# Patient Record
Sex: FEMALE | Race: WHITE | Employment: OTHER | URBAN - METROPOLITAN AREA
[De-identification: names, ages, dates, MRNs, and addresses within clinical notes are randomized per-mention and may not be internally consistent; named-entity substitution may affect disease eponyms.]

---

## 2018-01-02 ENCOUNTER — THERAPY VISIT (OUTPATIENT)
Dept: PHYSICAL THERAPY | Facility: CLINIC | Age: 38
End: 2018-01-02
Payer: COMMERCIAL

## 2018-01-02 DIAGNOSIS — M62.89 HIGH-TONE PELVIC FLOOR DYSFUNCTION: Primary | ICD-10-CM

## 2018-01-02 DIAGNOSIS — N39.3 URINARY, INCONTINENCE, STRESS FEMALE: ICD-10-CM

## 2018-01-02 PROCEDURE — 97161 PT EVAL LOW COMPLEX 20 MIN: CPT | Mod: GP | Performed by: PHYSICAL THERAPIST

## 2018-01-02 PROCEDURE — 97112 NEUROMUSCULAR REEDUCATION: CPT | Mod: GP | Performed by: PHYSICAL THERAPIST

## 2018-01-02 PROCEDURE — 97530 THERAPEUTIC ACTIVITIES: CPT | Mod: GP | Performed by: PHYSICAL THERAPIST

## 2018-01-02 NOTE — MR AVS SNAPSHOT
After Visit Summary   1/2/2018    Ashley Rueda    MRN: [id number]           Patient Information     Date Of Birth          1980        Visit Information        Provider Department      1/2/2018 9:25 AM Zuly Sheppard, PT [place] [facility]        Today's Diagnoses     High-tone pelvic floor dysfunction    -  1    Urinary, incontinence, stress female           Follow-ups after your visit        Your next 10 appointments already scheduled     Jan 11, 2018 10:05 AM CST   [facility] For [facility] Only with Zuly Sheppard, PT   [place] [facility] ([facility]  )    [address] [place] [address] [address]  [facility] [id number] [phone]            Jan 18, 2018 10:05 AM CST   MEG For Women Only with Zuly Sheppard PT   [place] [facility] ([facility]  )    [address] [place] [address] [address]  [facility] [id number] [phone]              Who to contact     If you have questions or need follow up information about today's clinic visit or your schedule please contact [place] [facility] directly at [phone].  Normal or non-critical lab and imaging results will be communicated to you by MyChart, letter or phone within 4 business days after the clinic has received the results. If you do not hear from us within 7 days, please contact the clinic through [employer] or phone. If you have a critical or abnormal lab result, we will notify you by phone as soon as possible.  Submit refill requests through [employer] or call your pharmacy and they will forward the refill request to us. Please allow 3 business days for your refill to be completed.          Additional Information About Your Visit        [facility] Health Information     [employer] lets you send messages to your doctor, view your test results, renew your prescriptions, schedule appointments and more. To sign up, go to  [email or web address] . Click on \"Log in\" on the left side of the screen, which will take you to the Welcome page. Then click on \"Sign up Now\" on the right side of the page.     You will be asked to enter the access code listed below, as well as some personal information. Please follow the directions to create your username and password.     Your access code is: [email or web address]  Expires: 2018  1:14 PM     Your access code will  in 90 days. If you need help or a new code, please call your [place] clinic or [phone].        Care EveryWhere ID     This is your Care EveryWhere ID. This could be used by other organizations to access your [place] medical records  [id number]         Blood Pressure from Last 3 Encounters:   12 97/65   11 108/68    Weight from Last 3 Encounters:   12 48.1 kg (106 lb 1.6 oz)   11 48.4 kg (106 lb 12.8 oz)              We Performed the Following     HC PT EVAL, LOW COMPLEXITY     NEUROMUSCULAR RE-EDUCATION     THERAPEUTIC ACTIVITIES        Primary Care Provider Office Phone # Fax #    Mikaela Baird PA-C [phone]       [address] [address]        Equal Access to Services     OTF NOBLES : Hadii kush ku hadasho Soomaali, waaxda luqadaha, qaybta kaalmada adeegyada, kiya farooq. So [facility] [phone].    ATENCIÓN: Si habla español, tiene a gil disposición servicios gratuitos de asistencia lingüística. [place] al [phone].    We comply with applicable federal civil rights laws and [place] laws. We do not discriminate on the basis of race, color, national origin, age, disability, sex, sexual orientation, or gender identity.            Thank you!     [facility] you for choosing [facility]  for your care. Our goal is always to provide you with excellent care. Hearing back from our patients is one way we can continue to improve our " services. Please take a few minutes to complete the written survey that you may receive in the mail after your visit with us. Thank you!             Your Updated Medication List - Protect others around you: Learn how to safely use, store and throw away your medicines at www.disposemymeds.org.          This list is accurate as of: 1/2/18  1:14 PM.  Always use your most recent med list.                   Brand Name Dispense Instructions for use Diagnosis    * norethindrone 0.35 MG per tablet    MICRONOR    3 Package    Take 1 tablet by mouth daily.    Contraception       * norethindrone 0.35 MG per tablet    MICRONOR    3 Package    Take 1 tablet by mouth daily.    Contraception       terconazole 0.4 % cream    TERAZOL 7    45 g    Place 1 applicator vaginally At Bedtime.    Yeast vaginitis       * Notice:  This list has 2 medication(s) that are the same as other medications prescribed for you. Read the directions carefully, and ask your doctor or other care provider to review them with you.

## 2018-01-02 NOTE — PROGRESS NOTES
"[place] [facility] Initial Evaluation  Subjective:  HPI History of current episode:  Patient is a 37 year old female who presents with urinary frequency, urgency and USI.  Patient is a  woman who states both pregnancy's ended in a vaginal delivery.  Patient reports that she tore during both deliveries and noticed an increase in symptoms following the birth of her second child.  Patient also reports that she has been diagnosed with interstitial cystitis back in  and also has had a diagnosis of dyspareunia and vulvodynia.  Patient reports that her vulvodynia and dyspareunia seemed to resolve following the birth of her first child.  Patient is currently a homemaker.  Patient states her goals of PT are to normalize bladder function so frequency and urgency issues do not bother her as well as wake her at night.  Patient states she currently wakes 3 times/night to urinate.    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 No   Do you leak with cough,sneeze, jumping, running?Yes   Any other activities that cause leaking? Yes   Do you have triggers that make you feel you can't wait to go to the bathroom? Yes What are they? After urinating when standing up.  Roadtrips and enviroments where I am unsure of where a bathroom is.  Type of pad and number used per day? 1 pantyliner/day  When you leak what is the amount? small  How long can you delay the need to urinate? \"As long as I need\"  Do you feel excessive pressure in pelvic floor:No   Frequency of daytime " urination:Unsure  Frequency of nighttime urination:3 times/night  Can you stop the flow of urine when on the toilet? Yes  Is the volume of urine passed usually: medium. (8sec rule= 250ml with average bladder storing 400-600ml)  Do you strain to pass urine? No  Do you have a slow or hesitant urinary stream? Yes  Do you have difficulty initiating the urine stream? No  Is urination painful? No  How many bladder infections have you had in last 12 months?0  Fluid intake(one glass is 8oz or one cup)  2 glasses/day,  2 caffinated glasses/day   0 alcohol glasses/day.  Bowel habits:  Frequency of bowel movements? 1  times a day  Consistancy of stool? soft formed  Do you ignore the urge to defecate? No  Do you strain to pass stool? No  Pelvic Pain:  Do you have any pelvic pain with intercourse, exams, use of tampons? No  Is initial penetration during intercourse painful? No  Is deeper penetration painful? No  Do you use lubricant? No What kind?   Are you sexually active?Yes  Have you ever been worried for your physical safety? No  Have you practiced the PF(kegel) exercises for 4 or more weeks? No  Marinoff Scale:Level 1  (Level 3: Abstinence from intercourse because of severe pain. Level 2: Painful intercourse which limites frequency of activity. Level 1: Painful intercourse not severe enough to prevent activity.)    Treatment/Education provided this session (see flow sheet for additional information):    Self Care Management/Patient education (12 min): Today's session consisted of education regarding pelvic floor muscle anatomy, normal bladder function, urge suppression techniques and/or relaxation techniques as indicated, and instruction in how to complete a bladder diary for assessment next visit. Depending on patient presentation, timed voids, double voids, and proper fluid/fiber intake discussed. Pt was instructed in the pathoanatomy of the pelvic floor utilizing pelvic model.  We discussed what pelvic floor physical  "therapy is, components of exam, and typical patient progression. Prior to internal PFM exam,  patient was told that they were in control of exam progression, and if at any time were uncomfortable and wished to discontinue we would.        NMR (12 min): Patient instruction in correct isolation of PFM/TrA then coordinated contraction of \"canister\" muscles: diaphragm, Transverse Abdominals, PFM, and posterior spine musculature.  Educated in initiating contraction from anal sphinctor, elevating through PFM, contraction of TrA, while exhaling slowly.  Cued for maximal and sub-maximal contractions, using hip rotators and adductors for overflow if needed.  Pt required cuing for each progression, with decreasing feedback as tolerated.  Instructed to avoid Valsalva/increased inter-abdominal pressure.  Pt cued through verbal, tactile (internal and external), and visual cuing utilizing biofeedback.  For HEP, pt encouraged to separate each phase of the exercise, then work towards coordination of the phases together with good breath technique with goal of developing good neuromuscular control of area.        NMR pelvic pain (12 min):  Pt education regarding contributing factors to pelvic pain and dysfunction related to overactivity in the pelvic floor.  Included resources for relaxed awareness and pelvic floor quieting techniqes.  Extra time spent describing pelvic floor muscle exam and treatment plan/goals, with attention to potential history that may contribute to current symptoms.  Included resources for home release techniques using dilators and/or thera wand as indicated.  Recommended partner involvement if available.  Discussed in detail potential physiological and behavioral components of pelvic pain.  Demonstrated/performed techniques for input to painful area while using visualization and relaxation.                                    Objective:  System                                 Pelvic Dysfunction Evaluation:  "   Bladder/Pelvic Problems:    Storage Problem:  Frequency, urgency, mixed incontinence, nocturia and stress incontinence  Emptying Problem:  Incomplete emptying  Dyspareunia:  Grade 1      Flexibility:    Tightness present at:Iliopsoas; Hamstrings and Piriformis  Obturator intenus, internal pelvic floor  Abdominal Wall:    Abdominal wall diastasis recti pelvic: 2 finger DR at umbilicus.  Trigger Points:  Iliopsoas    Pelvic Clock Exam:          Levator ANI:  +++    SI Provocation:  NA        Reflex Testing:  NA    External Assessment:    Skin Condition:  Normal  Scars:  Well healed  Bearing Down/Coughing:  Normal  Tissue Symmetry:  Normal  Introitus:  Normal  Muscle Contraction/Perineal Mobility:  Elevation and urogential triangle descent  Internal Assessment:  Internal assessment pelvic: Internal pelvic floor trigger points noted bilaterally of  levator ani, PC and CA.    Contraction/Grade:  Fair squeeze, definite lift (3)          SEMG Biofeedback:  Semg biofeedback pelvic: Surface EMG reveals resting baseline high at 2.8uV.  Ten second hold average 17.9uV and quick flick average of 16.1uV.  Patient demonstrates high resting tone of pelvic floor with inability to decrease tension.                EMG interpretation to fatigue:  5-8 seconds  Position:  SupineAdditional History:  Delivery History:  Tearing and vaginal delivery  Number of Pregnancies: 2  Number of Live Births: 2  Caffeine Consumption:  2                     General     ROS    Assessment/Plan:    Patient is a 37 year old female with pelvic complaints.    Patient has the following significant findings with corresponding treatment plan.                Diagnosis 1:  Pelvic floor dysfunction    Therapy Evaluation Codes:   1) History comprised of:   Personal factors that impact the plan of care:      None.    Comorbidity factors that impact the plan of care are:      Bowel/bladder changes.     Medications impacting care: None.  2) Examination of Body  Systems comprised of:   Body structures and functions that impact the plan of care:      Pelvis.   Activity limitations that impact the plan of care are:      Jumping, Lifting, Frequency, Stress incontinence and Urgency.  3) Clinical presentation characteristics are:   Stable/Uncomplicated.  4) Decision-Making    Low complexity using standardized patient assessment instrument and/or measureable assessment of functional outcome.  Cumulative Therapy Evaluation is: Low complexity.    Previous and current functional limitations:  (See Goal Flow Sheet for this information)    Short term and Long term goals: (See Goal Flow Sheet for this information)     Communication ability:  Patient appears to be able to clearly communicate and understand verbal and written communication and follow directions correctly.  Treatment Explanation - The following has been discussed with the patient:   RX ordered/plan of care  Anticipated outcomes  Possible risks and side effects  This patient would benefit from PT intervention to resume normal activities.   Rehab potential is good.    Frequency:  1 X week, once daily  Duration:  for 8 weeks  Discharge Plan:  Achieve all LTG.  Independent in home treatment program.  Reach maximal therapeutic benefit.    Please refer to the daily flowsheet for treatment today, total treatment time and time spent performing 1:1 timed codes.

## 2018-01-02 NOTE — PROGRESS NOTES
[place] [facility] Initial Evaluation  Subjective:  Patient is a 37 year old female presenting with rehab left ankle/foot hpi.                                      Pertinent medical history includes:  Anemia.  Medical allergies: yes (tylenol ).  Other surgeries include:  None reported.  Current medications:  None as reported by patient.  Current occupation is Homemaker   .  Patient is working in normal job without restrictions.  Primary job tasks include:  Lifting and driving.    Barriers include:  None as reported by patient.    Red flags:  Changes in bowel and bladder habits.                        Objective:  System    Physical Exam    General     ROS    Assessment/Plan:

## 2018-01-11 ENCOUNTER — THERAPY VISIT (OUTPATIENT)
Dept: PHYSICAL THERAPY | Facility: CLINIC | Age: 38
End: 2018-01-11
Payer: COMMERCIAL

## 2018-01-11 DIAGNOSIS — M62.89 PELVIC FLOOR DYSFUNCTION: Primary | ICD-10-CM

## 2018-01-11 PROCEDURE — 97530 THERAPEUTIC ACTIVITIES: CPT | Mod: GP | Performed by: PHYSICAL THERAPIST

## 2018-01-11 PROCEDURE — 97140 MANUAL THERAPY 1/> REGIONS: CPT | Mod: GP | Performed by: PHYSICAL THERAPIST

## 2018-01-11 PROCEDURE — 97110 THERAPEUTIC EXERCISES: CPT | Mod: GP | Performed by: PHYSICAL THERAPIST

## 2018-01-11 NOTE — MR AVS SNAPSHOT
"              After Visit Summary   1/11/2018    Ashley Rueda    MRN: [id number]           Patient Information     Date Of Birth          1980        Visit Information        Provider Department      1/11/2018 10:05 AM Zuly Sheppard PT [place] [facility]        Today's Diagnoses     Pelvic floor dysfunction    -  1       Follow-ups after your visit        Your next 10 appointments already scheduled     Jan 18, 2018 10:05 AM [facility]   MEG For Women Only with Zuly Sheppard PT   [place] [facility] ([facility] [place]  )    [address] [facility] [address]  [facility] [address] [phone]              Who to contact     If you have questions or need follow up information about today's clinic visit or your schedule please contact [place] [facility] THERAP[facility] directly at [phone].  Normal or non-critical lab and imaging results will be communicated to you by [employer], letter or phone within 4 business days after the clinic has received the results. If you do not hear from us within 7 days, please contact the clinic through [employer] or phone. If you have a critical or abnormal lab result, we will notify you by phone as soon as possible.  Submit refill requests through [employer] or call your pharmacy and they will forward the refill request to us. Please allow 3 business days for your refill to be completed.          Additional Information About Your Visit        MyChart Information     [employer] lets you send messages to your doctor, view your test results, renew your prescriptions, schedule appointments and more. To sign up, go to [email or web address] . Click on \"Log in\" on the left side of the screen, which will take you to the Welcome page. Then click on \"Sign up Now\" on the right side of the page.     You will be asked to enter the access code listed below, as well as some personal information. Please " follow the directions to create your username and password.     Your access code is: [email or web address]  Expires: 2018  1:14 PM     Your access code will  in 90 days. If you need help or a new code, please call your [place] clinic or [phone].        Care EveryWhere ID     This is your Care EveryWhere ID. This could be used by other organizations to access your [place] medical records  [id number]         Blood Pressure from Last 3 Encounters:   12 97/65   11 108/68    Weight from Last 3 Encounters:   12 48.1 kg (106 lb 1.6 oz)   11 48.4 kg (106 lb 12.8 oz)              We Performed the Following     MANUAL THER TECH,1+REGIONS,EA 15 MIN     THERAPEUTIC ACTIVITIES     THERAPEUTIC EXERCISES        Primary Care Provider Office Phone # Fax #    Mikaela Baird PA-C [phone]       [address] [address]        Equal Access to Services     JULIO NOBLES : Hadii aad ku hadasho Soomaali, waaxda luqadaha, qaybta kaalmada adeegyada, waxay idiin hayaan florence dueñas . So [facility] [phone].    ATENCIÓN: Si habla español, tiene a gil disposición servicios gratuitos de asistencia lingüística. [facility] [phone].    We comply with applicable federal civil rights laws and [place] laws. We do not discriminate on the basis of race, color, national origin, age, disability, sex, sexual orientation, or gender identity.            Thank you!     Thank you for choosing [facility]AP[facility]  for your care. Our goal is always to provide you with excellent care. Hearing back from our patients is one way we can continue to improve our services. Please take a few minutes to complete the written survey that you may receive in the mail after your visit with us. Thank you!             Your Updated Medication List - Protect others around you: Learn how to safely use, store and throw away your medicines at  www.disposemymeds.org.          This list is accurate as of: 1/11/18 11:49 AM.  Always use your most recent med list.                   Brand Name Dispense Instructions for use Diagnosis    * norethindrone 0.35 MG per tablet    MICRONOR    3 Package    Take 1 tablet by mouth daily.    Contraception       * norethindrone 0.35 MG per tablet    MICRONOR    3 Package    Take 1 tablet by mouth daily.    Contraception       terconazole 0.4 % cream    TERAZOL 7    45 g    Place 1 applicator vaginally At Bedtime.    Yeast vaginitis       * Notice:  This list has 2 medication(s) that are the same as other medications prescribed for you. Read the directions carefully, and ask your doctor or other care provider to review them with you.

## 2018-02-23 NOTE — PROGRESS NOTES
Discharge Note    Progress reporting period is from initial eval to Jan 11, 2018.     Ashley failed to return for next follow up visit and current status is unknown.  Please see information below for last relevant information on current status.  Patient seen for Rxs Used: 2 visits.    SUBJECTIVE  Subjective changes noted by patient:  Subjective: C/C is urgency and frequency without incontinence.  Patient thinks it is her IC and she is aware that her beverages are mostly bladder irritants..  Current pain level is Current Pain level: 0/10.     Previous pain level was  Initial Pain level: 0/10.   Changes in function:  Yes (See Goal flowsheet attached for changes in current functional level)  Adverse reaction to treatment or activity: None    OBJECTIVE  Changes noted in objective findings: Objective: Pubovesical ligament tight, and bilateral iliiopsoas restricted.    ASSESSMENT/PLAN  Diagnosis: Pelvic floor dysfunction   DIAGP:  The encounter diagnosis was Pelvic floor dysfunction.  Updated problem list and treatment plan:     Decreased function - HEP  Decreased strength - HEP    STG/LTGs have been met or progress has been made towards goals:  Yes, please see goal flowsheet for most current information.    Assessment of Progress: current status is unknown.  Last current status: Assessment of progress: Pt is progressing well.  Self Management Plans:  HEP    I have re-evaluated this patient and find that the nature, scope, duration and intensity of the therapy is appropriate for the medical condition of the patient.  Ashley continues to require the following intervention to meet STG and LTG's:  HEP.    Recommendations:  Discharge with current home program.  Patient to follow up with MD as needed. Episode to be closed at this time and patient formally discharged from therapy.    Zuly Sheppard, PT      Please refer to the daily flowsheet for treatment today, total treatment time and time spent performing 1:1 timed  codes.

## 2018-04-24 ENCOUNTER — OFFICE VISIT (OUTPATIENT)
Dept: FAMILY MEDICINE | Facility: CLINIC | Age: 38
End: 2018-04-24
Payer: COMMERCIAL

## 2018-04-24 VITALS
BODY MASS INDEX: 21.86 KG/M2 | TEMPERATURE: 97.3 F | OXYGEN SATURATION: 100 % | HEART RATE: 82 BPM | DIASTOLIC BLOOD PRESSURE: 71 MMHG | SYSTOLIC BLOOD PRESSURE: 112 MMHG | WEIGHT: 136 LBS | HEIGHT: 66 IN

## 2018-04-24 DIAGNOSIS — F43.9 STRESS: ICD-10-CM

## 2018-04-24 DIAGNOSIS — R42 LIGHTHEADEDNESS: Primary | ICD-10-CM

## 2018-04-24 PROCEDURE — 99203 OFFICE O/P NEW LOW 30 MIN: CPT | Performed by: NURSE PRACTITIONER

## 2018-04-24 NOTE — PROGRESS NOTES
"HPI      SUBJECTIVE:   Ashley Rueda is a 37 year old female who presents to clinic today for the following health issues:    Chief Complaint   Patient presents with     Headache     light headed and dizzy past week.  Aleve has not helped        Last Wed, 6 days ago, woke up and felt lightheaded  Mild HA with it. Went away with Aleve  Prior to this had a lot of stress and little sleep. The day the symptoms started was the day the stress was ending.   Kids had stomach flu and she had similar without vomiting 1.5 weeks ago otherwise no significant URI.    Lightheadedness has persisted and waxes and wanes   Eating and drinking doesn't help. 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  Past Surgical History:   Procedure Laterality Date     TONSILLECTOMY  age 7     Social History   Substance Use Topics     Smoking status: Never Smoker     Smokeless tobacco: Never Used     Alcohol use No     No current outpatient prescriptions on file.     Allergies   Allergen Reactions     Tylenol [Acetaminophen] Other (See Comments)     Increased heart rate and nausea        Reviewed and updated as needed this visit by clinical staff and provider      Review of Systems   Constitutional: Negative for fever.   Gastrointestinal: Negative for abdominal pain.   Genitourinary: Negative for dysuria.   Neurological: Positive for dizziness (lightheaded) and headaches (mild).         /71 (BP Location: Right arm, Patient Position: Chair, Cuff Size: Adult Regular)  Pulse 82  Temp 97.3  F (36.3  C) (Oral)  Ht 5' 6\" (1.676 m)  Wt 136 lb (61.7 kg)  LMP 04/10/2018  SpO2 100%  Breastfeeding? No  BMI 21.95 kg/m2    Physical Exam   Constitutional: " She is well-developed, well-nourished, and in no distress.   HENT:   Head: Normocephalic.   Right Ear: Tympanic membrane, external ear and ear canal normal.   Left Ear: Tympanic membrane, external ear and ear canal normal.   Eyes: Conjunctivae and EOM are normal. Pupils are equal, round, and reactive to light.   Neck: Normal range of motion.   Pulmonary/Chest: Effort normal.   Musculoskeletal: Normal range of motion.   Neurological: She is alert. No cranial nerve deficit. Gait normal.   Skin: Skin is warm and dry.   Psychiatric: Mood and affect normal.   Vitals reviewed.      Assessment and Plan:       ICD-10-CM    1. Lightheadedness R42    2. Stress F43.9        Patient presents for evaluation for persistent lightheadedness.  The symptoms started today her significant stress ended.  There are no red flags today so I do not feel imaging is warranted.  At this point we will continue to watch and wait. She will continue to work on stress relief. If symptoms are persisting in 2 weeks she will contact me or her PCP     KERRI Leggett, CNP  [facility]

## 2018-04-24 NOTE — MR AVS SNAPSHOT
"              After Visit Summary   2018    Ashley Rueda    MRN: [id number]           Patient Information     Date Of Birth          1980        Visit Information        Provider Department      2018 1:30 PM Aureliano Aragon APRN CNP [facility]        Today's Diagnoses     Lightheadedness    -  1    Stress           Follow-ups after your visit        Who to contact     If you have questions or need follow up information about today's clinic visit or your schedule please contact [facility] directly at [phone].  Normal or non-critical lab and imaging results will be communicated to you by [email or web address], letter or phone within 4 business days after the clinic has received the results. If you do not hear from us within 7 days, please contact the clinic through [email or web address] or phone. If you have a critical or abnormal lab result, we will notify you by phone as soon as possible.  Submit refill requests through [facility] or call your pharmacy and they will forward the refill request to us. Please allow 3 business days for your refill to be completed.          Additional Information About Your Visit        MyChart Information     [facility] lets you send messages to your doctor, view your test results, renew your prescriptions, schedule appointments and more. To sign up, go to [email or web address] . Click on \"Log in\" on the left side of the screen, which will take you to the Welcome page. Then click on \"Sign up Now\" on the right side of the page.     You will be asked to enter the access code listed below, as well as some personal information. Please follow the directions to create your username and password.     Your access code is: [email or web address]  Expires: 2018  1:22 PM     Your access code will  in 90 days. If you need help or a new code, please call your [facility] or [phone].        Care EveryWhere ID     This is your Care EveryWhere ID. This could be used " "by other organizations to access your [place] medical records  [id number]        Your Vitals Were     Pulse Temperature Height Last Period Pulse Oximetry Breastfeeding?    82 97.3  F (36.3  C) (Oral) 5' 6\" (1.676 m) 04/10/2018 100% No    BMI (Body Mass Index)                   21.95 kg/m2            Blood Pressure from Last 3 Encounters:   04/24/18 112/71   02/01/12 97/65   11/18/11 108/68    Weight from Last 3 Encounters:   04/24/18 136 lb (61.7 kg)   02/01/12 106 lb 1.6 oz (48.1 kg)   11/18/11 106 lb 12.8 oz (48.4 kg)              Today, you had the following     No orders found for display       Primary Care Provider Office Phone # Fax #    Mikaela Baird PA-C [phone]       [address] [address]        Equal Access to Services     JULIO NOBLES : Hadii aad ku hadasho Soomaali, waaxda luqadaha, qaybta kaalmada adeegyada, waxay idiin haylingn [place] dueñas . So [facility] [phone].    ATENCIÓN: Si habla español, tiene a gil disposición servicios gratuitos de asistencia lingüística. Llame al [phone].    We comply with applicable federal civil rights laws and [place] laws. We do not discriminate on the basis of race, color, national origin, age, disability, sex, sexual orientation, or gender identity.            Thank you!     Thank you for choosing [facility]  for your care. Our goal is always to provide you with excellent care. Hearing back from our patients is one way we can continue to improve our services. Please take a few minutes to complete the written survey that you may receive in the mail after your visit with us. Thank you!             Your Updated Medication List - Protect others around you: Learn how to safely use, store and throw away your medicines at www.disposemymeds.org.      Notice  As of 4/24/2018 11:59 PM    You have not been prescribed any medications.      "

## 2018-04-24 NOTE — NURSING NOTE
"Chief Complaint   Patient presents with     Headache     light headed and dizzy past week.  Aleve has not helped        Initial /71 (BP Location: Right arm, Patient Position: Chair, Cuff Size: Adult Regular)  Pulse 82  Temp 97.3  F (36.3  C) (Oral)  Ht 5' 6\" (1.676 m)  Wt 136 lb (61.7 kg)  LMP 04/10/2018  SpO2 100%  Breastfeeding? No  BMI 21.95 kg/m2 Estimated body mass index is 21.95 kg/(m^2) as calculated from the following:    Height as of this encounter: 5' 6\" (1.676 m).    Weight as of this encounter: 136 lb (61.7 kg).  Medication Reconciliation: complete  "

## 2018-04-26 ASSESSMENT — ENCOUNTER SYMPTOMS
DIZZINESS: 1
HEADACHES: 1
DYSURIA: 0
ABDOMINAL PAIN: 0
FEVER: 0

## 2019-11-15 ENCOUNTER — OFFICE VISIT (OUTPATIENT)
Dept: FAMILY MEDICINE | Facility: CLINIC | Age: 39
End: 2019-11-15
Payer: COMMERCIAL

## 2019-11-15 VITALS
TEMPERATURE: 98 F | OXYGEN SATURATION: 100 % | HEART RATE: 83 BPM | SYSTOLIC BLOOD PRESSURE: 102 MMHG | HEIGHT: 66 IN | BODY MASS INDEX: 19.61 KG/M2 | DIASTOLIC BLOOD PRESSURE: 69 MMHG | WEIGHT: 122 LBS

## 2019-11-15 DIAGNOSIS — R09.81 CONGESTION OF PARANASAL SINUS: ICD-10-CM

## 2019-11-15 DIAGNOSIS — R51.9 NONINTRACTABLE EPISODIC HEADACHE, UNSPECIFIED HEADACHE TYPE: ICD-10-CM

## 2019-11-15 DIAGNOSIS — R42 DIZZINESS: Primary | ICD-10-CM

## 2019-11-15 DIAGNOSIS — Z01.84 IMMUNITY TO VARICELLA DETERMINED BY SEROLOGIC TEST: ICD-10-CM

## 2019-11-15 LAB
ALBUMIN SERPL-MCNC: 4.7 G/DL (ref 3.4–5)
ALP SERPL-CCNC: 71 U/L (ref 40–150)
ALT SERPL W P-5'-P-CCNC: 16 U/L (ref 0–50)
ANION GAP SERPL CALCULATED.3IONS-SCNC: 5 MMOL/L (ref 3–14)
AST SERPL W P-5'-P-CCNC: 13 U/L (ref 0–45)
BILIRUB SERPL-MCNC: 1.2 MG/DL (ref 0.2–1.3)
BUN SERPL-MCNC: 13 MG/DL (ref 7–30)
CALCIUM SERPL-MCNC: 9 MG/DL (ref 8.5–10.1)
CHLORIDE SERPL-SCNC: 106 MMOL/L (ref 94–109)
CO2 SERPL-SCNC: 25 MMOL/L (ref 20–32)
CREAT SERPL-MCNC: 0.69 MG/DL (ref 0.52–1.04)
ERYTHROCYTE [DISTWIDTH] IN BLOOD BY AUTOMATED COUNT: 12.8 % (ref 10–15)
GFR SERPL CREATININE-BSD FRML MDRD: >90 ML/MIN/{1.73_M2}
GLUCOSE SERPL-MCNC: 89 MG/DL (ref 70–99)
HCT VFR BLD AUTO: 41.1 % (ref 35–47)
HGB BLD-MCNC: 13.9 G/DL (ref 11.7–15.7)
MCH RBC QN AUTO: 29.8 PG (ref 26.5–33)
MCHC RBC AUTO-ENTMCNC: 33.8 G/DL (ref 31.5–36.5)
MCV RBC AUTO: 88 FL (ref 78–100)
PLATELET # BLD AUTO: 298 10E9/L (ref 150–450)
POTASSIUM SERPL-SCNC: 3.9 MMOL/L (ref 3.4–5.3)
PROT SERPL-MCNC: 7.9 G/DL (ref 6.8–8.8)
RBC # BLD AUTO: 4.67 10E12/L (ref 3.8–5.2)
SODIUM SERPL-SCNC: 136 MMOL/L (ref 133–144)
TSH SERPL DL<=0.005 MIU/L-ACNC: 2.62 MU/L (ref 0.4–4)
WBC # BLD AUTO: 10.1 10E9/L (ref 4–11)

## 2019-11-15 PROCEDURE — 86140 C-REACTIVE PROTEIN: CPT | Performed by: INTERNAL MEDICINE

## 2019-11-15 PROCEDURE — 84443 ASSAY THYROID STIM HORMONE: CPT | Performed by: INTERNAL MEDICINE

## 2019-11-15 PROCEDURE — 99214 OFFICE O/P EST MOD 30 MIN: CPT | Performed by: INTERNAL MEDICINE

## 2019-11-15 PROCEDURE — 36415 COLL VENOUS BLD VENIPUNCTURE: CPT | Performed by: INTERNAL MEDICINE

## 2019-11-15 PROCEDURE — 80053 COMPREHEN METABOLIC PANEL: CPT | Performed by: INTERNAL MEDICINE

## 2019-11-15 PROCEDURE — 85027 COMPLETE CBC AUTOMATED: CPT | Performed by: INTERNAL MEDICINE

## 2019-11-15 PROCEDURE — 86787 VARICELLA-ZOSTER ANTIBODY: CPT | Performed by: INTERNAL MEDICINE

## 2019-11-15 RX ORDER — FLUTICASONE PROPIONATE 50 MCG
2 SPRAY, SUSPENSION (ML) NASAL DAILY
Qty: 18.2 ML | Refills: 0 | Status: SHIPPED | OUTPATIENT
Start: 2019-11-15 | End: 2019-12-06

## 2019-11-15 RX ORDER — ECHINACEA PURPUREA EXTRACT 125 MG
TABLET ORAL
Qty: 60 ML | Refills: 0 | Status: SHIPPED | OUTPATIENT
Start: 2019-11-15 | End: 2021-06-16

## 2019-11-15 ASSESSMENT — MIFFLIN-ST. JEOR: SCORE: 1260.14

## 2019-11-15 NOTE — PROGRESS NOTES
Subjective     Ashley Rueda is a 38 year old female who presents to clinic today for the following health issues:    HPI   New Patient/Transfer of Care  Dizziness      Duration: X 2-3 WEEKS    Description   Feeling faint:  no   Feeling like the surroundings are moving: YES  Loss of consciousness or falls: no     Intensity:  mild, moderate    Accompanying signs and symptoms:   Nausea/vomitting: no   Palpitations: no   Weakness in arms or legs: no   Vision or speech changes: YES- VISION FEELS BLURRY  Ringing in ears (Tinnitus): no   Hearing loss related to dizziness: no   Other (fevers/chills/sweating/dyspnea): YES- HEADACHE    History (similar episodes/head trauma/previous evaluation/recent bleeding): YES, X 1 YEAR AGO PATIENT HAD SIMILAR EPISODE    Precipitating or alleviating factors (new meds/chemicals): ALEVE, TAKES EDGE OFF BUT DOESN'T RESOLVE.   Worse with activity/head movement: YES, MORE INTENSE IN MORNING AND IS WORSE WITH MOVEMENT.     Therapies tried and outcome: ALEVE , TAKES EDGE OFF BUT DOESN'T RESOLVE SX'S       Patient presenting for evaluation of dizzy spells since last 3 weeks.  Patient describes more of lightheadedness; on further questioning she states when she turns her head to either side she feels more dizzy, denies vertigo per se, she does have also history of frontal headaches she feels the headaches started after her lightheadedness symptoms, she denies any preceding URI symptoms, she denies any nasal congestion or runny nose, no cough, fevers or chills, denies any neck pain, and no vomiting or GI symptoms and no diarrhea.  Denies any history of heavy menses or history of anemia or thyroid disease.  Patient has lost some 14 pounds since last 6 months, she feels she has been watching her diet better, cut down on soda and eating healthy, her body mass index was 22 down to 19.  She has been drinking a lot of water; mainly smart water as she describes.  She denies any vomiting, she denies  any weight concerns herself.  Denies any photosensitivity or phono sensitivity with the headaches; she takes Aleve 2 tablets at the onset of the headache which helped with her symptoms.  She denies any recent falls, denies any associated chest pain dyspnea palpitations presyncope or syncope.  No associated focal weakness or numbness or tingling or slurred speech or blurry vision or diplopia or other systemic complaints.  She is not on any oral contraceptive pills.  Patient was seen for lightheadedness back in April 2018 and was advised could be stress related.    Patient Active Problem List   Diagnosis   (none) - all problems resolved or deleted     Past Surgical History:   Procedure Laterality Date     TONSILLECTOMY  age 7       Social History     Tobacco Use     Smoking status: Never Smoker     Smokeless tobacco: Never Used   Substance Use Topics     Alcohol use: No     History reviewed. No pertinent family history.      Current Outpatient Medications   Medication Sig Dispense Refill     fluticasone (FLONASE) 50 MCG/ACT nasal spray Spray 2 sprays into both nostrils daily Direct outward towards your ears 18.2 mL 0     sodium chloride (OCEAN) 0.65 % nasal spray SPRAY 2 TIMES A DAY IN EACH NOSTRIL 60 mL 0     Allergies   Allergen Reactions     Tylenol [Acetaminophen] Other (See Comments)     Increased heart rate and nausea      Recent Labs   Lab Test 11/15/19  1727   ALT 16   CR 0.69   GFRESTIMATED >90   GFRESTBLACK >90   POTASSIUM 3.9   TSH 2.62      BP Readings from Last 3 Encounters:   11/15/19 102/69   04/24/18 112/71   02/01/12 97/65    Wt Readings from Last 3 Encounters:   11/15/19 55.3 kg (122 lb)   04/24/18 61.7 kg (136 lb)   02/01/12 48.1 kg (106 lb 1.6 oz)         Blood pressure maintained around 116 sitting and standing heart rate went from 79-89 upon standing.  Patient denies any dizziness with standing.          Reviewed and updated as needed this visit by Provider  Tobacco  Allergies  Meds  Med Hx   "Surg Hx  Fam Hx  Soc Hx        Review of Systems   ROS COMP: Constitutional, HEENT, cardiovascular, pulmonary, GI, , musculoskeletal, neuro, skin, endocrine and psych systems are negative, except as otherwise noted.      Objective    /69 (BP Location: Right arm, Patient Position: Sitting, Cuff Size: Adult Regular)   Pulse 83   Temp 98  F (36.7  C) (Oral)   Ht 1.676 m (5' 6\")   Wt 55.3 kg (122 lb)   LMP 10/25/2019 (Approximate)   SpO2 100%   Breastfeeding No   BMI 19.69 kg/m    Body mass index is 19.69 kg/m .  Physical Exam   GENERAL: healthy, alert and no distress  EYES: Eyes grossly normal to inspection, PERRL and conjunctivae and sclerae normal, negative nystagmus,  HENT: ear canals and TM's normal, nose and mouth without ulcers or lesions, nostril mucosa edematous turbinates right more than left with some clear discharge, negative [place]-Hallpike maneuver.  No sinus direct tenderness  NECK: no adenopathy, no asymmetry, masses, or scars and thyroid normal to palpation.  No bruits.  Neck is supple  RESP: lungs clear to auscultation - no rales, rhonchi or wheezes  CV: regular rate and rhythm, normal S1 S2, no S3 or S4, no murmur, click or rub, no peripheral edema and peripheral pulses strong  ABDOMEN: soft, nontender, no hepatosplenomegaly, no masses and bowel sounds normal  MS: no gross musculoskeletal defects noted, no edema  SKIN: no suspicious lesions or rashes  NEURO: Normal strength and tone, mentation intact and speech normal  PSYCH: mentation appears normal, affect normal/bright    Diagnostic Test Results:  Labs reviewed in Epic        Assessment & Plan   Problem List Items Addressed This Visit     None      Visit Diagnoses     Dizziness    -  Primary    Relevant Orders    CBC with platelets (Completed)    Comprehensive metabolic panel (Completed)    TSH with free T4 reflex (Completed)    CRP, inflammation (Completed)    Nonintractable episodic headache, unspecified headache type        " Relevant Orders    CBC with platelets (Completed)    Comprehensive metabolic panel (Completed)    CRP, inflammation (Completed)    Congestion of paranasal sinus        Relevant Medications    fluticasone (FLONASE) 50 MCG/ACT nasal spray    sodium chloride (OCEAN) 0.65 % nasal spray    Immunity to varicella determined by serologic test        Relevant Orders    Varicella Zoster Virus Antibody IgG (Completed)         Clinical decision-making: Advised patient to keep well-hydrated and check basic labs including hemoglobin and thyroid tests and electrolytes.  Advised to use some nasal decongestant with Flonase and saline rinses, advised that her frontal headaches could be sinus headache , the dizzy spells differential may include and not limited to labyrinthitis although, could not elicit nystagmus on exam and [place]-Hallpike maneuvers were negative and ear exam is negative, she denies any vertigo symptoms per se, doubt its benign positional paroxysmal vertigo.  Blood pressure was maintained with standing from a sitting position with slight rise in heart rate.   She is hemodynamically stable no red flags on exam or per history; her headache does not wake her up from sleep.  No findings of acute sinusitis on exam.  Her neurologic exam is intact no focal findings and negative cerebellar signs. follow-up in 1 week and as needed , further recommendation pending lab results.       See Patient Instructions  Return in about 1 week (around 11/22/2019).    Ran Lopez MD  [facility]

## 2019-11-15 NOTE — LETTER
[facility]  [address] [place] [address] [address] [facility]  [address]  [place]  [address]  Tel: [phone]    November 18, 2019    Ashley Angelica Mohit  [address] [facility] [address]        Dear Ms. Rueda,    Your labs reviewed and show normal inflammatory marker CRP,  Normal thyroid test called TSH  Normal kidney and electrolytes and liver enzyme and normal calcium level  CBC shows normal white blood cell count there is no infection, normal hemoglobin hematocrit, there is no anemia, normal platelet count. Your varicella titre is negative which means she is not immune to varicella.   Please be reassured all your labs look within normal.  Any further question happy to address.      Dr. Lopez/GERARDO        Enclosure: Lab Results  Results for orders placed or performed in visit on 11/15/19   CBC with platelets     Status: None   Result Value Ref Range    WBC 10.1 4.0 - 11.0 10e9/L    RBC Count 4.67 3.8 - 5.2 10e12/L    Hemoglobin 13.9 11.7 - 15.7 g/dL    Hematocrit 41.1 35.0 - 47.0 %    MCV 88 78 - 100 fl    MCH 29.8 26.5 - 33.0 pg    MCHC 33.8 31.5 - 36.5 g/dL    RDW 12.8 10.0 - 15.0 %    Platelet Count 298 150 - 450 10e9/L   Comprehensive metabolic panel     Status: None   Result Value Ref Range    Sodium 136 133 - 144 mmol/L    Potassium 3.9 3.4 - 5.3 mmol/L    Chloride 106 94 - 109 mmol/L    Carbon Dioxide 25 20 - 32 mmol/L    Anion Gap 5 3 - 14 mmol/L    Glucose 89 70 - 99 mg/dL    Urea Nitrogen 13 7 - 30 mg/dL    Creatinine 0.69 0.52 - 1.04 mg/dL    GFR Estimate >90 >60 mL/min/[1.73_m2]    GFR Estimate If Black >90 >60 mL/min/[1.73_m2]    Calcium 9.0 8.5 - 10.1 mg/dL    Bilirubin Total 1.2 0.2 - 1.3 mg/dL    Albumin 4.7 3.4 - 5.0 g/dL    Protein Total 7.9 6.8 - 8.8 g/dL    Alkaline Phosphatase 71 40 - 150 U/L    ALT 16 0 - 50 U/L    AST 13 0 - 45 U/L   TSH with free T4 reflex     Status: None   Result Value Ref Range    TSH 2.62 0.40 - 4.00 mU/L   CRP, inflammation     Status: None   Result Value Ref Range    CRP  Inflammation <2.9 0.0 - 8.0 mg/L   Varicella Zoster Virus Antibody IgG     Status: None   Result Value Ref Range    Varicella Zoster Virus Antibody IgG 0.5 0.0 - 0.8 AI

## 2019-11-16 LAB — CRP SERPL-MCNC: <2.9 MG/L (ref 0–8)

## 2019-11-17 LAB — VZV IGG SER QL IA: 0.5 AI (ref 0–0.8)

## 2019-11-17 NOTE — RESULT ENCOUNTER NOTE
Please notify patient of the following lab results  Ashley, your labs reviewed and show normal inflammatory marker CRP,  Normal thyroid test called TSH  Normal kidney and electrolytes and liver enzyme and normal calcium level  CBC shows normal white blood cell count there is no infection, normal hemoglobin hematocrit, there is no anemia, normal platelet count.  Please be reassured all your labs look within normal.  Any further question happy to address.  Dr. Lopez

## 2019-11-29 ENCOUNTER — OFFICE VISIT (OUTPATIENT)
Dept: FAMILY MEDICINE | Facility: CLINIC | Age: 39
End: 2019-11-29
Payer: COMMERCIAL

## 2019-11-29 VITALS
HEIGHT: 66 IN | WEIGHT: 123 LBS | OXYGEN SATURATION: 100 % | HEART RATE: 78 BPM | BODY MASS INDEX: 19.77 KG/M2 | DIASTOLIC BLOOD PRESSURE: 59 MMHG | SYSTOLIC BLOOD PRESSURE: 100 MMHG | TEMPERATURE: 97.9 F

## 2019-11-29 DIAGNOSIS — R42 DIZZINESS: ICD-10-CM

## 2019-11-29 DIAGNOSIS — J34.89 SINUS PRESSURE: Primary | ICD-10-CM

## 2019-11-29 PROCEDURE — 90716 VAR VACCINE LIVE SUBQ: CPT | Performed by: INTERNAL MEDICINE

## 2019-11-29 PROCEDURE — 90471 IMMUNIZATION ADMIN: CPT | Performed by: INTERNAL MEDICINE

## 2019-11-29 PROCEDURE — 99214 OFFICE O/P EST MOD 30 MIN: CPT | Mod: 25 | Performed by: INTERNAL MEDICINE

## 2019-11-29 ASSESSMENT — MIFFLIN-ST. JEOR: SCORE: 1259.67

## 2019-11-29 NOTE — NURSING NOTE
Clinic Administered Medication Documentation    MEDICATION LIST:   Injectable Medication Documentation    Patient was given Varicella. Prior to medication administration, verified patients identity using patient s name and date of birth. Please see MAR and medication order for additional information. Patient instructed to report any adverse reaction to staff immediately .      Was entire vial of medication used? Yes  Vial/Syringe: Single dose vial  Expiration Date:  2/19/2021  Was this medication supplied by the patient? No   Prior to immunization administration, verified patients identity using patient s name and date of birth. Please see Immunization Activity for additional information.     Screening Questionnaire for Adult Immunization    Are you sick today?   No   Do you have allergies to medications, food, a vaccine component or latex?   No   Have you ever had a serious reaction after receiving a vaccination?   No   Do you have a long-term health problem with heart disease, lung disease, asthma, kidney disease, metabolic disease (e.g. diabetes), anemia, or other blood disorder?   No   Do you have cancer, leukemia, HIV/AIDS, or any other immune system problem?   No   In the past 3 months, have you taken medications that affect  your immune system, such as prednisone, other steroids, or anticancer drugs; drugs for the treatment of rheumatoid arthritis, Crohn s disease, or psoriasis; or have you had radiation treatments?   No   Have you had a seizure, or a brain or other nervous system problem?   No   During the past year, have you received a transfusion of blood or blood     products, or been given immune (gamma) globulin or antiviral drug?   No   For women: Are you pregnant or is there a chance you could become        pregnant during the next month?   No   Have you received any vaccinations in the past 4 weeks?   No     Immunization questionnaire answers were all negative.        Per orders of Dr. Lopez  injection of Varicella given by Nita Riley CMA. Patient instructed to remain in clinic for 15 minutes afterwards, and to report any adverse reaction to me immediately.       Screening performed by Nita Riley CMA on 11/29/2019 at 2:04 PM.

## 2019-12-06 DIAGNOSIS — R09.81 CONGESTION OF PARANASAL SINUS: ICD-10-CM

## 2019-12-06 NOTE — TELEPHONE ENCOUNTER
PCP needs to be updated, patient established care with Dr Lopez in November    Susanna Syed, RT (R)

## 2019-12-06 NOTE — TELEPHONE ENCOUNTER
"Pending Prescriptions:                       Disp   Refills    fluticasone (FLONASE) 50 MCG/ACT nasal sp*18.2 mL             Sig: Spray 2 sprays into both nostrils daily Direct           outward towards your ears    Last Written Prescription Date:  11/15/2019  Last Fill Quantity: 18.2mL,  # refills: 0   Last office visit: 11/29/2019 with prescribing provider:     Future Office Visit:    Requested Prescriptions   Pending Prescriptions Disp Refills     fluticasone (FLONASE) 50 MCG/ACT nasal spray 18.2 mL      Sig: Spray 2 sprays into both nostrils daily Direct outward towards your ears       Inhaled Steroids Protocol Passed - 12/6/2019  4:08 PM        Passed - Patient is age 12 or older        Passed - Recent (12 mo) or future (30 days) visit within the authorizing provider's specialty     Patient has had an office visit with the authorizing provider or a provider within the authorizing providers department within the previous 12 mos or has a future within next 30 days. See \"Patient Info\" tab in inbasket, or \"Choose Columns\" in Meds & Orders section of the refill encounter.              Passed - Medication is active on med list          "

## 2019-12-09 RX ORDER — FLUTICASONE PROPIONATE 50 MCG
2 SPRAY, SUSPENSION (ML) NASAL DAILY
Qty: 18.2 ML | Refills: 0 | Status: SHIPPED | OUTPATIENT
Start: 2019-12-09 | End: 2021-06-16

## 2019-12-09 NOTE — TELEPHONE ENCOUNTER
Prescription approved per [facility].  Sophia MONTOYA RN

## 2020-02-23 ENCOUNTER — HEALTH MAINTENANCE LETTER (OUTPATIENT)
Age: 40
End: 2020-02-23

## 2020-06-07 ENCOUNTER — E-VISIT (OUTPATIENT)
Dept: FAMILY MEDICINE | Facility: CLINIC | Age: 40
End: 2020-06-07
Payer: COMMERCIAL

## 2020-06-07 DIAGNOSIS — R51.9 NONINTRACTABLE HEADACHE, UNSPECIFIED CHRONICITY PATTERN, UNSPECIFIED HEADACHE TYPE: ICD-10-CM

## 2020-06-07 DIAGNOSIS — R05.9 COUGH: ICD-10-CM

## 2020-06-07 DIAGNOSIS — Z20.822 SUSPECTED COVID-19 VIRUS INFECTION: ICD-10-CM

## 2020-06-07 PROCEDURE — 99421 OL DIG E/M SVC 5-10 MIN: CPT | Performed by: INTERNAL MEDICINE

## 2020-06-08 ENCOUNTER — VIRTUAL VISIT (OUTPATIENT)
Dept: FAMILY MEDICINE | Facility: OTHER | Age: 40
End: 2020-06-08

## 2020-06-08 NOTE — TELEPHONE ENCOUNTER
Ashley your symptoms could be viral related or could be possible allergies especially to headache and a cough, cough could be from postnasal drip.  While is difficult to be sure as we have not examined you, I encourage you schedule a telephone or virtual visit follow-up to discuss further, meanwhile advised her to quarantine yourself at least 7days until symptoms resolve and 3 days after fever completely subsides off antipyretics and whichever is longer.  Please use nasal saline rinses, Flonase and Claritin for possible underlying allergies.  If any worsening of cough or shortness of breath or spiking fever 100.4 Fahrenheit or above please need notify MD immediately.  I also advise you that you contact today [email or web address] for further evaluation for possible COVID-19 infection and placement of COV JOAN 2 viral PCR testing by the [email or web address] health providers as there are special locations where you can do these tests.  Any further questions happy to address   E-Visit time total (minutes): 5 minutes

## 2020-06-08 NOTE — PROGRESS NOTES
"Date: 2020 14:12:15  Clinician: Familia Fernández  Clinician NPI: [id number]  Patient: Ashley Rueda  Patient : 1980  Patient Address: [facility] [place] [place] [address]  Patient Phone: [phone]  Visit Protocol: URI  Patient Summary:  Ashley is a 39 year old ( : 1980 ) female who initiated a Visit for COVID-19 (Coronavirus) evaluation and screening. When asked the question \"Please sign me up to receive news, health information and promotions from [email or web address].\", Ashley responded \"No\".    Her symptoms consist of a sore throat and a cough.   Symptom details     Cough: Ashley coughs a few times an hour and her cough is more bothersome at night. Phlegm does not come into her throat when she coughs. She believes her cough is caused by post-nasal drip.     Sore throat: Ashley reports having mild throat pain (1-3 on a 10 point pain scale), does not have exudate on her tonsils, and can swallow liquids. The lymph nodes in her neck are not enlarged. A rash has not appeared on the skin since the sore throat started.      Ashley denies having wheezing, nausea, teeth pain, ageusia, diarrhea, enlarged lymph nodes, malaise, myalgias, anosmia, facial pain or pressure, fever, nasal congestion, vomiting, rhinitis, ear pain, headache, and chills. She also denies having recent facial or sinus surgery in the past 60 days and taking antibiotic medication for the symptoms. She is not experiencing dyspnea.   Precipitating events  Within the past week, Ashley has not been exposed to someone with strep throat. She has not recently been exposed to someone with influenza. Ashley has not been in close contact with any high risk individuals.   Pertinent COVID-19 (Coronavirus) information  In the past 14 days, Ashley has not worked in a congregate living setting.   She does not work or volunteer as healthcare worker or a  and does not work or volunteer in a healthcare facility.   Ashley also has not " lived in a congregate living setting in the past 14 days. She does not live with a healthcare worker.   Ashley has not had a close contact with a laboratory-confirmed COVID-19 patient within 14 days of symptom onset.   Pertinent medical history  Ashley typically gets a yeast infection when she takes antibiotics. She has used fluconazole (Diflucan) to treat previous yeast infections. 1 dose of fluconazole (Diflucan) has typically been sufficient for symptoms to resolve in the past.   Ashley does not need a return to work/school note.   Weight: 125 lbs   Ashley does not smoke or use smokeless tobacco.   She denies pregnancy and denies breastfeeding. She is currently menstruating.   Additional information as reported by the patient (free text): I went to a protest and helped with a clean up effort in [place] last week.   Weight: 125 lbs    MEDICATIONS: No current medications, ALLERGIES: Tylenol  Clinician Response:  Dear Ashley,   Your symptoms show that you may have coronavirus (COVID-19). This illness can cause fever, cough and trouble breathing. Many people get a mild case and get better on their own. Some people can get very sick.  What should I do?  We would like to test you for this virus. This will be a curbside test done outside the clinic.   1. Please call [phone] to schedule your visit. Explain that you were referred by [facility] to have a COVID-19 test. Be ready to share your [facility] visit ID number.  The following will serve as your written order for this COVID Test, ordered by me, for the indication of suspected COVID [Z20.828]: The test will be ordered in [facility], our electronic health record, after you are scheduled. It will show as ordered and authorized by Dar Fragoso MD.  Order: COVID-19 (Coronavirus) PCR for SYMPTOMATIC testing from [facility].      2. When it's time for your COVID test:  Stay at least 6 feet away from others. (If someone will drive you to your test, stay in the backseat, as far  "away from the  as you can.)   Cover your mouth and nose with a mask, tissue or washcloth.  Go straight to the testing site. Don't make any stops on the way there or back.      3.Starting now: Stay home and away from others (self-isolate) until:   You've had no fever---and no medicine that reduces fever---for 3 full days (72 hours). And...   Your other symptoms have gotten better. For example, your cough or breathing has improved. And...   At least 10 days have passed since your symptoms started.       During this time, don't leave the house except for testing or medical care.   Stay in your own room, even for meals. Use your own bathroom if you can.   Stay away from others in your home. No hugging, kissing or shaking hands. No visitors.  Don't go to work, school or anywhere else.    Clean \"high touch\" surfaces often (doorknobs, counters, handles, etc.). Use a household cleaning spray or wipes. You'll find a full list of  on the EPA website: www.epa.gov/pesticide-registration/list-n-disinfectants-use-against-sars-cov-2.   Cover your mouth and nose with a mask, tissue or washcloth to avoid spreading germs.  Wash your hands and face often. Use soap and water.  Caregivers in these groups are at risk for severe illness due to COVID-19:  o People 65 years and older  o People who live in a nursing home or long-term care facility  o People with chronic disease (lung, heart, cancer, diabetes, kidney, liver, immunologic)  o People who have a weakened immune system, including those who:   Are in cancer treatment  Take medicine that weakens the immune system, such as corticosteroids  Had a bone marrow or organ transplant  Have an immune deficiency  Have poorly controlled HIV or AIDS  Are obese (body mass index of 40 or higher)  Smoke regularly   o Caregivers should wear gloves while washing dishes, handling laundry and cleaning bedrooms and bathrooms.  o Use caution when washing and drying laundry: Don't shake " dirty laundry, and use the warmest water setting that you can.  o For more tips, go to www.cdc.gov/coronavirus/2019-ncov/downloads/10Things.pdf.      How can I take care of myself?   Get lots of rest. Drink extra fluids (unless a doctor has told you not to).   Take Tylenol (acetaminophen) for fever or pain. If you have liver or kidney problems, ask your family doctor if it's okay to take Tylenol.   Adults can take either:    650 mg (two 325 mg pills) every 4 to 6 hours, or...   1,000 mg (two 500 mg pills) every 8 hours as needed.    Note: Don't take more than 3,000 mg in one day. Acetaminophen is found in many medicines (both prescribed and over-the-counter medicines). Read all labels to be sure you don't take too much.   For children, check the Tylenol bottle for the right dose. The dose is based on the child's age or weight.    If you have other health problems (like cancer, heart failure, an organ transplant or severe kidney disease): Call your specialty clinic if you don't feel better in the next 2 days.       Know when to call 911. Emergency warning signs include:    Trouble breathing or shortness of breath Pain or pressure in the chest that doesn't go away Feeling confused like you haven't felt before, or not being able to wake up Bluish-colored lips or face  5.Sign up for [email or web address]. We know it's scary to hear that you might have COVID-19. We want to track your symptoms to make sure you're okay over the next 2 weeks. Please look for an email from [email or web address]---this is a free, online program that we'll use to keep in touch. To sign up, follow the link in the email. Learn more at [email or web address].      Where can I get more information?   [facility] -- About COVID-19: [email or web address]   CDC -- What to Do If You're Sick: www.cdc.gov/coronavirus/2019-ncov/about/steps-when-sick.html   CDC -- Ending Home Isolation:  www.cdc.gov/coronavirus/2019-ncov/hcp/disposition-in-home-patients.html   [facility] -- Caring for Someone: www.cdc.gov/coronavirus/2019-ncov/if-you-are-sick/care-for-someone.html   [facility] -- Interim Guidance for Hospital Discharge to Home: www.[facility].[place]/diseases/coronavirus/hcp/hospdischarge.pdf   [facility] clinical trials (COVID-19 research studies): clinicalaffairs.[facility]-clinical-trials    Below are the COVID-19 hotlines at the [place] Department of Health ([facility]). Interpreters are available.    For health questions: Call 484-365-4879 or 1-851.139.1382 (7 a.m. to 7 p.m.) For questions about schools and childcare: Call 524-222-1720 or 1-811.916.1759 (7 a.m. to 7 p.m.)    Diagnosis: Cough  Diagnosis ICD: R05

## 2020-06-08 NOTE — PATIENT INSTRUCTIONS
Thank you for choosing us for your care. I think an [email or web address] visit would be best next steps based on your symptoms. Please schedule an appointment;      Thank you for choosing us for your care. Based on your symptoms and length of illness, I do not think that you need a prescription at this time.  Please follow the care advise I've provided and use the over the counter medications to help relieve your symptoms.   View your full visit summary for details by clicking on the link below.     If you're not feeling better within 2-3 days, please respond to this message and we can consider if a prescription is needed.    Thank you for choosing us for your care. Based on your symptoms and length of illness, I do not think that you need an antibiotic prescription at this time.  Please follow the care advise I ve provided and use the suggested medication to help relieve your symptoms. View your full visit summary for details by clicking on the link below.     If you re not feeling better within 5-7 days, please respond to this message and we can consider if an antibiotic prescription is needed.  Your symptoms show that you may have coronavirus (COVID-19). This illness can cause fever, cough and trouble breathing. Many people get a mild case and get better on their own. Some people can get very sick.     We d like you to contact our online care team. Please follow these instructions:  1. Go to the website [email or web address].  2. Create an account. (If you have health insurance, please have your insurance card ready.)   3. Start a new visit.  4. Choose a reason for your visit (such as COVID-19).  5. Answer questions about your symptoms.  6. Our care team will contact you about what to do next.    How can I protect others from COVID-19?     Stay home and away from others (self-isolate) until:    At least 10 days have passed since your symptoms started. And     You ve had no fever--and no medicine that reduces fever--for 3 full days  (72 hours). And      Your other symptoms have resolved (gotten better).     During this time:    Stay in your own room (and use your own bathroom), if you can.    Stay away from others in your home. No hugging, kissing or shaking hands.    No visitors.    Don t go to work, school or anywhere else.     Clean  high touch  surfaces often (doorknobs, counters, handles, etc.). Use a household cleaning spray or wipes.    Cover your mouth and nose with a mask, tissue or wash cloth to avoid spreading germs.    Wash your hands and face often. Use soap and water.    For more tips, go to https://www.cdc.gov/coronavirus/2019-ncov/downloads/10Things.pdf.      How can I take care of myself?    1. Get lots of rest. Drink extra fluids (unless a doctor has told you not to).     2. Take Tylenol (acetaminophen) for fever or pain. If you have liver or kidney problems, ask your family doctor if it's okay to take Tylenol.     Adults can take either:     650 mg (two 325 mg pills) every 4 to 6 hours, or     1,000 mg (two 500 mg pills) every 8 hours as needed.     Note: Don't take more than 3,000 mg in one day.   Acetaminophen is found in many medicines (both prescribed and over-the-counter medicines). Read all labels to be sure you don't take too much.   For children, check the Tylenol bottle for the right dose. The dose is based on the child's age or weight.    3. If you have other health problems (like cancer, heart failure, an organ transplant or severe kidney disease): Call your specialty clinic if you don't feel better in the next 2 days.    4. Know when to call 911: If your breathing is so bad that it keeps you from doing normal activities, call 911 or go to the emergency room. Tell them that you've been staying home and may have COVID-19.      Thank you for limiting contact with others, wearing a simple mask to cover your cough, practice good hand hygiene habits and accessing our virtual services where possible to limit the spread  of this virus.    For more information about COVID19 and options for caring for yourself at home, please visit the CDC website at https://www.cdc.gov/coronavirus/2019-ncov/about/steps-when-sick.html  For more options for care at [facility], please visit our website at [email or web address]     Viral Upper Respiratory Illness (Adult)    You have a viral upper respiratory illness (URI), which is another term for the common cold. This illness is contagious during the first few days. It is spread through the air by coughing and sneezing. It may also be spread by direct contact (touching the sick person and then touching your own eyes, nose, or mouth). Frequent handwashing will decrease risk of spread. Most viral illnesses go away within 7 to 10 days with rest and simple home remedies. Sometimes the illness may last for several weeks. Antibiotics will not kill a virus, and they are generally not prescribed for this condition.  Home care    If symptoms are severe, rest at home for the first 2 to 3 days. When you resume activity, don't let yourself get too tired.    Don't smoke. If you need help stopping, talk with your healthcare provider.    Avoid being exposed to cigarette smoke (yours or others ).    You may use acetaminophen or ibuprofen to control pain and fever, unless another medicine was prescribed. If you have chronic liver or kidney disease, have ever had a stomach ulcer or gastrointestinal bleeding, or are taking blood-thinning medicines, talk with your healthcare provider before using these medicines. Aspirin should never be given to anyone under 18 years of age who is ill with a viral infection or fever. It may cause severe liver or brain damage.    Your appetite may be poor, so a light diet is fine. Stay well hydrated by drinking 6 to 8 glasses of fluids per day (water, soft drinks, juices, tea, or soup). Extra fluids will help loosen secretions in the nose and  lungs.    Over-the-counter cold medicines will not shorten the length of time you re sick, but they may be helpful for the following symptoms: cough, sore throat, and nasal and sinus congestion. If you take prescription medicines, ask your healthcare provider or pharmacist which over-the-counter medicines are safe to use. (Note: Don't use decongestants if you have high blood pressure.)  Follow-up care  Follow up with your healthcare provider, or as advised.  When to seek medical advice  Call your healthcare provider right away if any of these occur:    Cough with lots of colored sputum (mucus)    Severe headache; face, neck, or ear pain    Difficulty swallowing due to throat pain    Fever of 100.4 F (38 C) or higher, or as directed by your healthcare provider  Call 911  Call 911 if any of these occur:    Chest pain, shortness of breath, wheezing, or difficulty breathing    Coughing up blood    Very severe pain with swallowing, especially if it goes along with a muffled voice   Date Last Reviewed: 6/1/2018 2000-2019 [employer]. [address] [place] [address]. All rights reserved. This information is not intended as a substitute for professional medical care. Always follow your healthcare professional's instructions.         The symptoms you describe suggest a viral cause, which is much more common than a bacterial cause. Antibiotics will treat bacterial infections, but have no effect on viral infections. If possible, especially if improving, start with symptom care for the first 7-10 days, then consider seeking further treatment or taking an antibiotic. Bacterial infections generally are more severe, including symptoms such as pus, fever over 101degrees F, or rapidly worsening.

## 2020-06-10 DIAGNOSIS — Z20.822 SUSPECTED COVID-19 VIRUS INFECTION: Primary | ICD-10-CM

## 2020-06-10 LAB
SARS-COV-2 RNA SPEC QL NAA+PROBE: NOT DETECTED
SPECIMEN SOURCE: NORMAL

## 2020-06-10 PROCEDURE — 99000 SPECIMEN HANDLING OFFICE-LAB: CPT | Performed by: FAMILY MEDICINE

## 2020-06-10 PROCEDURE — 99207 ZZC NO BILLABLE SERVICE THIS VISIT: CPT

## 2020-06-10 PROCEDURE — 87635 SARS-COV-2 COVID-19 AMP PRB: CPT | Mod: 90 | Performed by: FAMILY MEDICINE

## 2020-06-10 NOTE — LETTER
June 11, 2020        Ashley Rueda  [address] [place] [address]    This letter provides a written record that you were tested for COVID-19 on 6/10/20.   Your result was negative.    This means that we didn t find the virus that causes COVID-19 in your sample. A test may show negative when you do actually have the virus. This can happen when the virus is in the early stages of infection, before you feel illness symptoms.    Even if you don t have symptoms, they may still appear. For safety, it s very important to follow these rules.    Keep yourself away from others (self-isolation):      Stay home. Don t go to work, school or anywhere else.     Stay in your own room (and use your own bathroom), if you can.    Stay away from others in your home. No hugging, kissing or shaking hands. No visitors.    Clean  high touch  surfaces often (doorknobs, counters, handles, etc.). Use a household cleaning spray or wipes.    Cover your mouth and nose with a mask, tissue or washcloth to avoid spreading germs.    Wash your hands and face often with soap and water.    Stay in self-isolation until you meet ALL of the guidelines below:    1. You have had no fever for at least 72 hours (that is 3 full days of no fever without the use of medicine that reduces fevers), AND  2. other symptoms (such as cough, shortness of breath) have gotten better, AND  3. at least 10 days have passed since your symptoms first appeared.    Going back to work  Check with your employer for any guidelines to follow for going back to work.    Employers: This document serves as formal notice that your employee tested negative for COVID-19, as of the testing date shown above.    For questions regarding this letter or your Negative COVID-19 result, call [phone] between 8A to 6:30P (M-F) and 10A to 6:30P (weekends).

## 2021-04-11 ENCOUNTER — HEALTH MAINTENANCE LETTER (OUTPATIENT)
Age: 41
End: 2021-04-11

## 2021-06-16 ENCOUNTER — OFFICE VISIT (OUTPATIENT)
Dept: FAMILY MEDICINE | Facility: CLINIC | Age: 41
End: 2021-06-16
Payer: COMMERCIAL

## 2021-06-16 VITALS
TEMPERATURE: 99.7 F | BODY MASS INDEX: 21.14 KG/M2 | DIASTOLIC BLOOD PRESSURE: 83 MMHG | OXYGEN SATURATION: 100 % | SYSTOLIC BLOOD PRESSURE: 119 MMHG | HEART RATE: 84 BPM | WEIGHT: 131 LBS

## 2021-06-16 DIAGNOSIS — F32.A DEPRESSION, UNSPECIFIED DEPRESSION TYPE: Primary | ICD-10-CM

## 2021-06-16 PROCEDURE — 96127 BRIEF EMOTIONAL/BEHAV ASSMT: CPT | Performed by: NURSE PRACTITIONER

## 2021-06-16 PROCEDURE — 99214 OFFICE O/P EST MOD 30 MIN: CPT | Performed by: NURSE PRACTITIONER

## 2021-06-16 ASSESSMENT — ANXIETY QUESTIONNAIRES
IF YOU CHECKED OFF ANY PROBLEMS ON THIS QUESTIONNAIRE, HOW DIFFICULT HAVE THESE PROBLEMS MADE IT FOR YOU TO DO YOUR WORK, TAKE CARE OF THINGS AT HOME, OR GET ALONG WITH OTHER PEOPLE: NOT DIFFICULT AT ALL
5. BEING SO RESTLESS THAT IT IS HARD TO SIT STILL: NOT AT ALL
2. NOT BEING ABLE TO STOP OR CONTROL WORRYING: NOT AT ALL
1. FEELING NERVOUS, ANXIOUS, OR ON EDGE: NOT AT ALL
7. FEELING AFRAID AS IF SOMETHING AWFUL MIGHT HAPPEN: NOT AT ALL
6. BECOMING EASILY ANNOYED OR IRRITABLE: NOT AT ALL
GAD7 TOTAL SCORE: 1
3. WORRYING TOO MUCH ABOUT DIFFERENT THINGS: SEVERAL DAYS

## 2021-06-16 ASSESSMENT — PATIENT HEALTH QUESTIONNAIRE - PHQ9
SUM OF ALL RESPONSES TO PHQ QUESTIONS 1-9: 8
5. POOR APPETITE OR OVEREATING: NOT AT ALL

## 2021-06-16 NOTE — PROGRESS NOTES
Assessment & Plan   Problem List Items Addressed This Visit     None      Visit Diagnoses     Depression, unspecified depression type    -  Primary    Relevant Orders    Thyroid peroxidase antibody    T4, free    TSH    Vitamin D Deficiency    Vitamin B12    T3, Free    Progesterone    Estradiol    Cortisol    DHEA sulfate    **Testosterone Free and Total FUTURE anytime         Pt feeling depressed for the last 10 years following the birth of her first child. At the point where it is affecting her life such that she is not comfortable anymore. Ddx hormonal abnormality, adrenal etiology, thyroid, among others. Checking labs at a future date as they require precise timing with menstrual cycle. Pt wanting to wait for results to figure out treatment plan.        Patient Instructions   Consider Vitamin D supplement           No follow-ups on file.    KERRI Busby CNP  [facility] HAYLEY Ramirez is a 40 year old who presents for the following health issues     HPI     Abnormal Mood Symptoms  Onset/Duration: 10 years  Description: pt describes as low depression, low energy, not motivation  Depression (if yes, do PHQ-9): YES  Anxiety (if yes, do CYN-7): YES  Accompanying Signs & Symptoms:  Still participating in activities that you used to enjoy: YES- art  Fatigue: YES  Irritability: no  Difficulty concentrating: YES  Changes in appetite: no  Problems with sleep: YES  Heart racing/beating fast: no  Abnormally elevated, expansive, or irritable mood: no  Persistently increased activity or energy: no  Thoughts of hurting yourself or others: no  History:  Recent stress or major life event: YES- father in law passed away  Prior depression or anxiety: yes  Family history of depression or anxiety: YES- mother  Alcohol/drug use: no  Difficulty sleeping: no  Precipitating or alleviating factors: None  Therapies tried and outcome: lifestyle changes and diet  No flowsheet data found.  No  flowsheet data found.     Ongoing since had first daughter 10 years ago    Feeling low level depression,   Not excited about anything  Really have to push self to play or do things  Difficult to feel motivated to get to do things  Anxiety has always been a part of her life  The depression is now just exhausting  Can count on one hand the number of times she has been excited about something in the last 10 years    2 kids 10 and 7  Had postpartum anxiety after her first   It was easier with the second one   Periods are normal. Every 28 days. Lasts 3 days   Lots of cramping but no PMS   Last period 6/11  Stopping OCP helped depression a little     Sleeps well. Falls asleep really easily. Wakes every few hours.   Will get up to go to the bathroom. Usually falls back asleep unless there is something stressful.     Doesn't have the energy to deal with something stressful.     Mom has been on meds for depression for a long time     Diet is ok   Saw GI for some stomach issues   Trying fodmap diet. Has fructose intolerance     GUT:  Sometimes gets a lower abd cramping or shooting pain   Had some US with her OB that were normal   Did breath test for fructose and glucose. Fructose is high   No longer gets the sharp pain   Lots of bloating constantly mainly of the lower abd   Pretty normal BMs. A little more constipation   No significant gas         Review of Systems   Detailed as above         Objective    /83 (BP Location: Right arm, Cuff Size: Adult Regular)   Pulse 84   Temp 99.7  F (37.6  C) (Tympanic)   Wt 59.4 kg (131 lb)   SpO2 100%   BMI 21.14 kg/m    Body mass index is 21.14 kg/m .  Physical Exam  Constitutional:       Appearance: Normal appearance.   Psychiatric:         Mood and Affect: Mood normal.         Behavior: Behavior normal.         Judgment: Judgment normal.                  I saw this patient in collaboration with Shira Sanchez, NP Student      I was present with the APRN/PA student who  participated in the service and in the documentation of the services provided. I have verified the history and personally performed the physical exam and medical decision making, as documented by the student and edited by me.     KERRI Hankins, CNP    Shira Sanchez NP Student

## 2021-06-17 ASSESSMENT — ANXIETY QUESTIONNAIRES: GAD7 TOTAL SCORE: 1

## 2021-06-30 DIAGNOSIS — F32.A DEPRESSION, UNSPECIFIED DEPRESSION TYPE: ICD-10-CM

## 2021-06-30 LAB
CORTIS SERPL-MCNC: 13.8 UG/DL (ref 4–22)
DEPRECATED CALCIDIOL+CALCIFEROL SERPL-MC: 26 UG/L (ref 20–75)
ESTRADIOL SERPL-MCNC: 96 PG/ML
PROGEST SERPL-MCNC: 19.4 NG/ML
T3FREE SERPL-MCNC: 2.6 PG/ML (ref 2.3–4.2)
VIT B12 SERPL-MCNC: 399 PG/ML (ref 193–986)

## 2021-06-30 PROCEDURE — 84439 ASSAY OF FREE THYROXINE: CPT | Performed by: NURSE PRACTITIONER

## 2021-06-30 PROCEDURE — 84403 ASSAY OF TOTAL TESTOSTERONE: CPT | Mod: 90 | Performed by: NURSE PRACTITIONER

## 2021-06-30 PROCEDURE — 86376 MICROSOMAL ANTIBODY EACH: CPT | Performed by: NURSE PRACTITIONER

## 2021-06-30 PROCEDURE — 84270 ASSAY OF SEX HORMONE GLOBUL: CPT | Performed by: NURSE PRACTITIONER

## 2021-06-30 PROCEDURE — 84481 FREE ASSAY (FT-3): CPT | Performed by: NURSE PRACTITIONER

## 2021-06-30 PROCEDURE — 84144 ASSAY OF PROGESTERONE: CPT | Performed by: NURSE PRACTITIONER

## 2021-06-30 PROCEDURE — 82306 VITAMIN D 25 HYDROXY: CPT | Performed by: NURSE PRACTITIONER

## 2021-06-30 PROCEDURE — 82627 DEHYDROEPIANDROSTERONE: CPT | Performed by: NURSE PRACTITIONER

## 2021-06-30 PROCEDURE — 84443 ASSAY THYROID STIM HORMONE: CPT | Performed by: NURSE PRACTITIONER

## 2021-06-30 PROCEDURE — 82533 TOTAL CORTISOL: CPT | Performed by: NURSE PRACTITIONER

## 2021-06-30 PROCEDURE — 36415 COLL VENOUS BLD VENIPUNCTURE: CPT | Performed by: NURSE PRACTITIONER

## 2021-06-30 PROCEDURE — 99000 SPECIMEN HANDLING OFFICE-LAB: CPT | Performed by: NURSE PRACTITIONER

## 2021-06-30 PROCEDURE — 82607 VITAMIN B-12: CPT | Performed by: NURSE PRACTITIONER

## 2021-06-30 PROCEDURE — 82670 ASSAY OF TOTAL ESTRADIOL: CPT | Performed by: NURSE PRACTITIONER

## 2021-07-01 LAB
DHEA-S SERPL-MCNC: 66 UG/DL (ref 35–430)
T4 FREE SERPL-MCNC: 1.01 NG/DL (ref 0.76–1.46)
THYROPEROXIDASE AB SERPL-ACNC: <10 IU/ML
TSH SERPL DL<=0.005 MIU/L-ACNC: 2.61 MU/L (ref 0.4–4)

## 2021-07-02 LAB
SHBG SERPL-SCNC: 49 NMOL/L (ref 30–135)
TESTOST FREE SERPL-MCNC: 0.23 NG/DL (ref 0.13–0.92)
TESTOST SERPL-MCNC: 17 NG/DL (ref 8–60)

## 2021-07-02 NOTE — RESULT ENCOUNTER NOTE
Dick Ramirez, still waiting on one test but I'll comment on half of the results:  Thyroid looks pretty good. You don't have any antibodies which is helpful to rule out hashimoto's. Your thyroid breakdown is pretty close to optimal so I have zero concerns about your thyroid.   Your cortisol is just slightly low. Optimal is 16-20 and you're at 13.8 so really close. It can change with stress. So taking care of yourself and making sure you are taking time to rest is important.   VitB12 is close to optimal. I want it >700. You could consider a B complex vitamin a few times a week   Vit D optimal is 50-80 so you're a little low there. I would at least take 2000iu daily if you are not already. If you are then increase by 2000.   I will comment on the hormones when they all return. But so far your progesterone looks fantastic and your estrogen is a little on the low side, which is normal as we approach age 40.   We'll be in touch! Enjoy your weekend if the tests aren't back by then.  Aureliano

## 2021-07-06 NOTE — RESULT ENCOUNTER NOTE
"Finally have the last of it. Testosterone is a little low. Ideally its 25-35 so you are just slightly low at 17.   DHEA is a hormone produced by the adrenals and is kind of the \"vitality\" hormone. It can help with a lot of things including depression symptoms. Your level is very low. The best way to help this is to support the adrenals: decrease stress, get sleep, avoid too much coffee/caffeine, etc. There are supplements on the market but I'd be careful with that. There are many sub [employer] companies who have poor quality supplements. Happy to chat more about this with you but definitely start with adrenal support. That will be your biggest bang for your mckay.   Aureliano"

## 2021-09-26 ENCOUNTER — HEALTH MAINTENANCE LETTER (OUTPATIENT)
Age: 41
End: 2021-09-26

## 2022-05-07 ENCOUNTER — HEALTH MAINTENANCE LETTER (OUTPATIENT)
Age: 42
End: 2022-05-07

## 2023-04-23 ENCOUNTER — HEALTH MAINTENANCE LETTER (OUTPATIENT)
Age: 43
End: 2023-04-23

## 2023-06-02 ENCOUNTER — HEALTH MAINTENANCE LETTER (OUTPATIENT)
Age: 43
End: 2023-06-02

## 2024-02-10 ENCOUNTER — HEALTH MAINTENANCE LETTER (OUTPATIENT)
Age: 44
End: 2024-02-10

## 2024-06-29 ENCOUNTER — HEALTH MAINTENANCE LETTER (OUTPATIENT)
Age: 44
End: 2024-06-29

## 2025-04-05 NOTE — PROGRESS NOTES
Problem: Cardiac Rhythm Disturbances with or without Devices  Goal: Hemodynamic stability achieved/maintained  Outcome: Adequate for discharge  Goal: Anxiety is controlled  Outcome: Adequate for discharge  Goal: Participates in ADL/Activity without s/s of intolerance  Outcome: Adequate for discharge  Goal: Urinary elimination pattern returned to baseline  Description: Patient must be making adequate amounts of urine output (240cc/8 hours) and voiding. If indwelling urinary catheter: Remove asap (no later an Day 2 or provider must specify reason for continued use).  Outcome: Adequate for discharge  Goal: Verbalizes understanding of rhythm disturbance, treatment procedure and pre, post-, and d/c care specific to intervention  Description: Document on Patient Education Activity  Outcome: Adequate for discharge      Subjective     Ashley Rueda is a 39 year old female who presents to clinic today for the following health issues:    HPI     Light Headed      Duration: 4 weeks    Description (location/character/radiation): light headed, motion    Intensity:  mild    Accompanying signs and symptoms: headache    History (similar episodes/previous evaluation): None    Precipitating or alleviating factors: None    Therapies tried and outcome: None     Ashley is presenting for follow-up today, she felt some lightheadedness, describes as a constant symptom, she has some frontal sinus pressure, she has been using her Flonase 1 puffs twice a day as well as saline rinses twice a day, it seems that it helped with her symptoms for a week or so and then she had recurrence today of symptoms, denies any fever chills ,when she started using the saline rinses she felt some postnasal drip for couple days after.  She feels some pressure in her ears but no pain, no vomiting, no neck pain, no and no cough.  No leg edema, no orthostatic dizziness, she has been drinking a lot of fluids.  She had lost 14 pounds from April but now her weight has been maintained ,she actually gained 1 pound.  Denies any diarrhea or loose stools, denies any palpitations or chest discomfort or other systemic complaints.  Denies any headache apart from the frontal sinus pressure she describes.  Denies any vertigo per se denies any imbalance or any falls.  Denies any unusual stressors apart from her child had broken arm when she was under stress from that.  Patient Active Problem List   Diagnosis   (none) - all problems resolved or deleted     Past Surgical History:   Procedure Laterality Date     TONSILLECTOMY  age 7       Social History     Tobacco Use     Smoking status: Never Smoker     Smokeless tobacco: Never Used   Substance Use Topics     Alcohol use: No     No family history on file.      Current Outpatient Medications   Medication Sig Dispense Refill      "fluticasone (FLONASE) 50 MCG/ACT nasal spray Spray 2 sprays into both nostrils daily Direct outward towards your ears 18.2 mL 0     sodium chloride (OCEAN) 0.65 % nasal spray SPRAY 2 TIMES A DAY IN EACH NOSTRIL 60 mL 0     Allergies   Allergen Reactions     Tylenol [Acetaminophen] Other (See Comments)     Increased heart rate and nausea      Recent Labs   Lab Test 11/15/19  1727   ALT 16   CR 0.69   GFRESTIMATED >90   GFRESTBLACK >90   POTASSIUM 3.9   TSH 2.62      BP Readings from Last 3 Encounters:   11/29/19 100/59   11/15/19 102/69   04/24/18 112/71    Wt Readings from Last 3 Encounters:   11/29/19 55.8 kg (123 lb)   11/15/19 55.3 kg (122 lb)   04/24/18 61.7 kg (136 lb)                 Reviewed and updated as needed this visit by Provider  Tobacco  Meds  Med Hx  Surg Hx  Soc Hx        Review of Systems   ROS COMP: Constitutional, HEENT, cardiovascular, pulmonary, gi and gu systems are negative, except as otherwise noted.      Objective    /59 (BP Location: Right arm, Patient Position: Chair, Cuff Size: Adult Regular)   Pulse 78   Temp 97.9  F (36.6  C) (Tympanic)   Ht 1.676 m (5' 6\")   Wt 55.8 kg (123 lb)   SpO2 100%   BMI 19.85 kg/m    Body mass index is 19.85 kg/m .  Physical Exam   GENERAL: healthy, alert and no distress  EYES: Eyes grossly normal to inspection, PERRL and conjunctivae and sclerae normal, no nystagmus  HENT: ear canals and TM's normal, nose and mouth without ulcers or lesions, no nystagmus.  NECK: no adenopathy, no asymmetry, masses, or scars and thyroid normal to palpation  RESP: lungs clear to auscultation - no rales, rhonchi or wheezes  CV: regular rate and rhythm, normal S1 S2, no S3 or S4, no murmur, click or rub, no peripheral edema and peripheral pulses strong  ABDOMEN: soft, nontender, no hepatosplenomegaly, no masses and bowel sounds normal  MS: no gross musculoskeletal defects noted, no edema  SKIN: no suspicious lesions or rashes  NEURO: Normal strength and tone, " mentation intact and speech normal negative cerebellar signs normal rapid hand alternating finger-to-nose exam and heel-to-toe exam.  Normal tandem walking  PSYCH: mentation appears normal, affect normal/bright    Diagnostic Test Results:  Labs reviewed in Epic        Assessment & Plan   Problem List Items Addressed This Visit     None      Visit Diagnoses     Sinus pressure    -  Primary    Relevant Orders    OTOLARYNGOLOGY REFERRAL    Dizziness        Relevant Orders    OTOLARYNGOLOGY REFERRAL         Clinical decision-making reassurance given I do not see any neurologic focal deficits on exam  Continues on nasal decongestants with Flonase and doing more frequent nasal saline rinses, can use Claritin 10 mg once daily.  Will refer to ENT for further evaluation of her chronic sinus symptoms with some lightheadedness and possible labyrinthitis,  could also be an element also of eustachian tube dysfunction, advised to do Valsalva maneuver with blowing and closing her nose will help with eustachian tube dysfunction,.  Keep well-hydrated , she does run low blood pressure but we checked her electrolytes were normal, she does not have any orthostatic dizziness per se.  If any worsening of symptoms ,or headache or vision change or focal weakness or numbness; will consider further neuroimaging including MRI of the brain.  Patient is in agreement with plan follow-up as needed and keep us updated on her symptoms.  Patient discharged from clinic in stable condition.  Reviewed her labs; no need for repeat labs today.      See Patient Instructions  No follow-ups on file.    Ran Lopez MD  [facility]